# Patient Record
Sex: FEMALE | Race: WHITE | NOT HISPANIC OR LATINO | Employment: UNEMPLOYED | URBAN - NONMETROPOLITAN AREA
[De-identification: names, ages, dates, MRNs, and addresses within clinical notes are randomized per-mention and may not be internally consistent; named-entity substitution may affect disease eponyms.]

---

## 2017-07-24 ENCOUNTER — TELEPHONE (OUTPATIENT)
Dept: NEUROSURGERY | Facility: CLINIC | Age: 57
End: 2017-07-24

## 2017-07-24 NOTE — TELEPHONE ENCOUNTER
Patient called about her appt w/Tomasz on 8/2/2017.  She said she was in terrible pain, she doesn't get out of bed until after noon b/c of the pain, she says her primary care won't give her any more pain medicine.  She doesn't think she can wait until 8/2/17 to be seen.  I offered her an appt on 8/1/2017 @ 9:30 am (only 1 day sooner but that is the only opening we have at this time) but she refused stating it was too early in the morning.  She says she can't write b/c her hands are so swollen and she has severe pain in her hands.  She was very very tearful and at some points in the conversation I couldn't understand what she was saying.  I again offered her the appt on 8/1/17 but she refused again.  She said she was told to go to the ER but they won't do anything for her or give her anything either.  At this time she is going to keep the 8/2/17 appt with Tomasz.    Ismael Berg CMA

## 2017-07-27 ENCOUNTER — TELEPHONE (OUTPATIENT)
Dept: NEUROSURGERY | Facility: CLINIC | Age: 57
End: 2017-07-27

## 2017-07-27 NOTE — TELEPHONE ENCOUNTER
Patient called, she was called regarding her appointment on 8-2-17 was told to bring CD of films.  She said she was not able to go by and pick films up. She had called and spoken to radiology department and they would send them if we called them.   I made a call to phone # [phone] and asked to be transferred to radiology department.  I spoke to Angela first and told her why I was calling.  She asked Shelia to speak to me so she came to the phone.  I advised her why I  was calling.  Shelia had spoke to Jessica and told her if we would send/fax a request for the CD they could mail it. I typed up a note and asked them to send the films to our office fax #[phone] I faxed request.

## 2017-08-02 ENCOUNTER — OFFICE VISIT (OUTPATIENT)
Dept: NEUROSURGERY | Facility: CLINIC | Age: 57
End: 2017-08-02

## 2017-08-02 VITALS
BODY MASS INDEX: 31.65 KG/M2 | DIASTOLIC BLOOD PRESSURE: 90 MMHG | HEIGHT: 65 IN | WEIGHT: 190 LBS | SYSTOLIC BLOOD PRESSURE: 140 MMHG

## 2017-08-02 DIAGNOSIS — M50.20 DISPLACEMENT OF CERVICAL INTERVERTEBRAL DISC WITHOUT MYELOPATHY: Primary | ICD-10-CM

## 2017-08-02 DIAGNOSIS — F17.200 SMOKER: ICD-10-CM

## 2017-08-02 PROCEDURE — 99204 OFFICE O/P NEW MOD 45 MIN: CPT | Performed by: NURSE PRACTITIONER

## 2017-08-02 RX ORDER — OMEPRAZOLE 20 MG/1
20 CAPSULE, DELAYED RELEASE ORAL DAILY
COMMUNITY

## 2017-08-02 RX ORDER — TIZANIDINE 4 MG/1
4 TABLET ORAL NIGHTLY PRN
COMMUNITY

## 2017-08-02 RX ORDER — THEOPHYLLINE 400 MG/1
400 TABLET, EXTENDED RELEASE ORAL DAILY
COMMUNITY

## 2017-08-02 RX ORDER — LEVOTHYROXINE SODIUM 0.1 MG/1
100 TABLET ORAL DAILY
COMMUNITY

## 2017-08-02 RX ORDER — HYDROCODONE BITARTRATE AND ACETAMINOPHEN 5; 325 MG/1; MG/1
1 TABLET ORAL EVERY 8 HOURS PRN
Qty: 90 TABLET | Refills: 0 | Status: SHIPPED | OUTPATIENT
Start: 2017-08-02

## 2017-08-02 NOTE — PROGRESS NOTES
Chief complaint:   Chief Complaint   Patient presents with   • Neck Pain     Jessica is referred here today with neck pain, she also has left arm pain and numbness. She has tried physical therapy, but they discontinued her until she see her doctor.        Subjective     HPI: This is a 56-year-old female patient who is referred to us by Dr. Loera for neck and left arm pain.  She is here to be evaluated today.  She says that she just woke up back in February with complaint of neck pain and has been progressively getting worse over the last few months.  She says the pain in her neck is constant.  She says nothing will make it better.  Its worse when she sitting for too long.  She will have pain in her upper extremities bilaterally but the left is worse than the right.  She has associated numbness and tingling on the left.  The pain on the right arm only goes down to the elbow.  She says this pain is constant.  She says nothing will make it better.  She has says that any movement will make it worse.  She denies any bowel or bladder issues.  She has done about 6 sessions of therapy but says that it was making her worse and the therapy did not feel that continuing with physical therapy was given a make any improvement in the patient.  She is not any chiropractic care pain management injections.  She has been taking anti-inflammatory medication along with a muscle relaxer.  She is also had steroid injections but states only offer temporary relief.  She is right-hand dominant.  She currently is not working.  She is .    Review of Systems   HENT: Positive for hearing loss.    Respiratory: Positive for shortness of breath and wheezing.    Musculoskeletal: Positive for arthralgias, myalgias and neck pain.   Neurological: Positive for weakness, numbness and headaches.   All other systems reviewed and are negative.       Past Medical History:   Diagnosis Date   • Arthritis      Past Surgical History:   Procedure  "Laterality Date   • GALLBLADDER SURGERY       Family History   Problem Relation Age of Onset   • Heart disease Mother    • Cancer Father    • No Known Problems Sister    • No Known Problems Sister      Social History   Substance Use Topics   • Smoking status: Current Every Day Smoker   • Smokeless tobacco: None   • Alcohol use No       (Not in a hospital admission)  Allergies:  Penicillins    Objective      Vital Signs  /90 (BP Location: Right arm, Patient Position: Sitting)  Ht 65\" (165.1 cm)  Wt 190 lb (86.2 kg)  BMI 31.62 kg/m2    Physical Exam   Constitutional: She is oriented to person, place, and time. She appears well-developed and well-nourished.   HENT:   Head: Normocephalic.   Eyes: Conjunctivae, EOM and lids are normal. Pupils are equal, round, and reactive to light.   Neck: Normal range of motion.   Cardiovascular: Normal rate, regular rhythm and normal heart sounds.    Pulmonary/Chest: Effort normal and breath sounds normal.   Abdominal: Normal appearance.   Musculoskeletal: Normal range of motion.        Cervical back: She exhibits pain.   Patient is having break away weakness due to pain in her triceps and deltoid is having difficulty moving her left arm because of this.   Neurological: She is alert and oriented to person, place, and time. She has normal strength. No cranial nerve deficit or sensory deficit. GCS eye subscore is 4. GCS verbal subscore is 5. GCS motor subscore is 6.   Reflex Scores:       Bicep reflexes are 3+ on the right side and 3+ on the left side.       Brachioradialis reflexes are 3+ on the right side and 3+ on the left side.  No Hoffmans or clonus visualized.   Skin: Skin is warm.   Psychiatric: She has a normal mood and affect. Her speech is normal and behavior is normal. Thought content normal. Cognition and memory are normal.       Results Review: MRI of cervical spine shows the patient does have displacement at C5-6 that is mostly centrally located but also off to " the left putting some pressure on the nerve root on the left.  There is some mild stenosis present at C6-7 again causing more left-sided than right neural foraminal narrowing.  She also does have straightening of her cervical curvature.  No cord signal change.  No fracture visualized.          Assessment/Plan: At this point the patient has been through therapy without any relief.  She is in an extreme amount of pain from the disc herniation.  I am going to have the patient come back to see Dr. Sainz to discuss surgical intervention for her versus the possibility of injections.  I am going to go ahead and give the patient a low dose narcotic and have her sign a pain contract today.  At this point I think it would be beneficial for the patient to have someone drive her back and forth for long distances to her doctors appointment here in [place] due to the fact that the patient is in an extreme amount of pain which could affect her driving plus with the fact that the patient would be taking narcotic it is not safe for her to be driving alone.  We will have her come back and see Dr. Sainz as soon as possible.  BMI shows that she is overweight.  BMI chart was given the patient P she is a smoker.  Smoking cessation classes were given to the patient.      Jessica was seen today for neck pain.    Diagnoses and all orders for this visit:    Displacement of cervical intervertebral disc without myelopathy    Smoker    BMI 31.0-31.9,adult    Other orders  -     SCANNED - IMAGING  -     HYDROcodone-acetaminophen (NORCO) 5-325 MG per tablet; Take 1 tablet by mouth Every 8 (Eight) Hours As Needed for Moderate Pain (4-6).        I discussed the patients findings and my recommendations with patient    Tomasz Marinelli, RACHEL  08/02/17  11:10 AM

## 2017-08-03 ENCOUNTER — HOSPITAL ENCOUNTER (OUTPATIENT)
Dept: GENERAL RADIOLOGY | Facility: HOSPITAL | Age: 57
Discharge: HOME | End: 2017-08-03
Admitting: NURSE PRACTITIONER

## 2017-08-03 ENCOUNTER — OFFICE VISIT (OUTPATIENT)
Dept: NEUROSURGERY | Facility: CLINIC | Age: 57
End: 2017-08-03

## 2017-08-03 VITALS
WEIGHT: 190 LBS | DIASTOLIC BLOOD PRESSURE: 74 MMHG | BODY MASS INDEX: 31.65 KG/M2 | SYSTOLIC BLOOD PRESSURE: 112 MMHG | HEIGHT: 65 IN

## 2017-08-03 DIAGNOSIS — E66.3 OVERWEIGHT: ICD-10-CM

## 2017-08-03 DIAGNOSIS — F17.200 SMOKER: ICD-10-CM

## 2017-08-03 DIAGNOSIS — M50.20 DISPLACEMENT OF CERVICAL INTERVERTEBRAL DISC WITHOUT MYELOPATHY: Primary | ICD-10-CM

## 2017-08-03 PROCEDURE — 99213 OFFICE O/P EST LOW 20 MIN: CPT | Performed by: NURSE PRACTITIONER

## 2017-08-03 PROCEDURE — 72052 X-RAY EXAM NECK SPINE 6/>VWS: CPT

## 2017-08-03 NOTE — PROGRESS NOTES
"    Chief complaint:   Chief Complaint   Patient presents with   • Neck Pain     Patient returns to discuss what her treatment options are for her cervical pain.        Subjective     HPI: This is a 56-year-old who we saw yesterday for complaint of neck and arm pain.  She is here in follow today.  She states that she continues to have pain in her neck that is constant.  Pain will radiate down into her arms bilaterally however it is worse on the left than it is on the right.  She says the pain is constant.  She discuss pain as a sharp shooting pain.  Rates the pain a scale 0-10 at a 10.  She says it does not appear with activities of daily living.  Denies any bowel or bladder issues.  Denies any falls.  She says it does hurt worse  with activity and is better with rest.    Review of Systems   Musculoskeletal: Positive for neck pain.   Neurological: Positive for headaches.        Past Medical History:   Diagnosis Date   • Arthritis      Past Surgical History:   Procedure Laterality Date   • GALLBLADDER SURGERY       Family History   Problem Relation Age of Onset   • Heart disease Mother    • Cancer Father    • No Known Problems Sister    • No Known Problems Sister      Social History   Substance Use Topics   • Smoking status: Current Every Day Smoker   • Smokeless tobacco: Not on file   • Alcohol use No       (Not in a hospital admission)  Allergies:  Penicillins    Objective      Vital Signs  /74 (BP Location: Right arm, Patient Position: Sitting)  Ht 65\" (165.1 cm)  Wt 190 lb (86.2 kg)  BMI 31.62 kg/m2    Physical Exam   Constitutional: She is oriented to person, place, and time. She appears well-developed and well-nourished.   HENT:   Head: Normocephalic.   Eyes: EOM are normal. Pupils are equal, round, and reactive to light.   Neck: Normal range of motion.   Pulmonary/Chest: Effort normal.   Musculoskeletal: Normal range of motion.   Neurological: She is alert and oriented to person, place, and time. She " has normal strength and normal reflexes. No cranial nerve deficit or sensory deficit. Gait normal. GCS eye subscore is 4. GCS verbal subscore is 5. GCS motor subscore is 6.   Skin: Skin is warm.   Psychiatric: She has a normal mood and affect. Her speech is normal and behavior is normal. Thought content normal.       Results Review: No new imaging          Assessment/Plan: Dr. Sainz did come and discuss this with the patient she does have cervical disc displacement at C5-6 and some neural foraminal narrowing at C6-7.  The disc herniation is more prominent on the left than it is on the right.  Mild cord narrowing as well.  At this point is felt that the patient would benefit from going to pain management and having injections done see if she responds to this before considering a surgical intervention.  We will follow-up again with her in 6 weeks which time she will see Dr. Sainz.  BMI shows that she is very overweight.  BMI chart was given the patient.  She is a smoker.  Smoking cessation classes were given to the patient.      Jessica was seen today for neck pain.    Diagnoses and all orders for this visit:    Displacement of cervical intervertebral disc without myelopathy  -     XR Spine Cervical Complete With Obli Flex Ext  -     Ambulatory Referral to Family Practice    Overweight    Smoker        I discussed the patients findings and my recommendations with patient    Tomasz Marinelli, RACHEL  08/03/17  11:02 AM

## 2017-11-01 ENCOUNTER — HOSPITAL ENCOUNTER (OUTPATIENT)
Dept: PAIN MANAGEMENT | Age: 57
Discharge: HOME | End: 2017-11-01
Payer: MEDICAID

## 2017-11-01 VITALS
OXYGEN SATURATION: 99 % | RESPIRATION RATE: 18 BRPM | TEMPERATURE: 96.8 F | SYSTOLIC BLOOD PRESSURE: 147 MMHG | HEIGHT: 65 IN | BODY MASS INDEX: 32.82 KG/M2 | WEIGHT: 197 LBS | DIASTOLIC BLOOD PRESSURE: 97 MMHG | HEART RATE: 96 BPM

## 2017-11-01 PROCEDURE — 80307 DRUG TEST PRSMV CHEM ANLYZR: CPT

## 2017-11-01 PROCEDURE — 99204 OFFICE O/P NEW MOD 45 MIN: CPT

## 2017-11-01 RX ORDER — ALBUTEROL SULFATE 2.5 MG/3ML
2.5 SOLUTION RESPIRATORY (INHALATION) 4 TIMES DAILY PRN
COMMUNITY
Start: 2017-08-30

## 2017-11-01 RX ORDER — LEVOTHYROXINE SODIUM 0.1 MG/1
100 TABLET ORAL DAILY
COMMUNITY

## 2017-11-01 RX ORDER — FLUTICASONE FUROATE AND VILANTEROL TRIFENATATE 100; 25 UG/1; UG/1
1 POWDER RESPIRATORY (INHALATION) PRN
COMMUNITY
Start: 2017-08-30

## 2017-11-01 RX ORDER — THEOPHYLLINE 400 MG/1
400 TABLET, EXTENDED RELEASE ORAL DAILY
COMMUNITY

## 2017-11-01 RX ORDER — OMEPRAZOLE 20 MG/1
20 CAPSULE, DELAYED RELEASE ORAL DAILY
COMMUNITY

## 2017-11-01 RX ORDER — TIZANIDINE 4 MG/1
4 TABLET ORAL 3 TIMES DAILY PRN
COMMUNITY

## 2017-11-01 RX ORDER — HYDROCODONE BITARTRATE AND ACETAMINOPHEN 5; 325 MG/1; MG/1
1 TABLET ORAL 2 TIMES DAILY PRN
Qty: 45 TABLET | Refills: 0 | Status: SHIPPED | OUTPATIENT
Start: 2017-11-01 | End: 2017-12-15 | Stop reason: SDUPTHER

## 2017-11-01 ASSESSMENT — PAIN DESCRIPTION - LOCATION: LOCATION: NECK

## 2017-11-01 ASSESSMENT — PAIN DESCRIPTION - FREQUENCY: FREQUENCY: CONTINUOUS

## 2017-11-01 ASSESSMENT — PAIN DESCRIPTION - PROGRESSION: CLINICAL_PROGRESSION: GRADUALLY WORSENING

## 2017-11-01 ASSESSMENT — PAIN DESCRIPTION - DESCRIPTORS: DESCRIPTORS: ACHING;SHARP;STABBING

## 2017-11-01 ASSESSMENT — ACTIVITIES OF DAILY LIVING (ADL): EFFECT OF PAIN ON DAILY ACTIVITIES: LIMITS ACTIVITIES

## 2017-11-01 ASSESSMENT — PAIN SCALES - GENERAL: PAINLEVEL_OUTOF10: 9

## 2017-11-01 ASSESSMENT — PAIN DESCRIPTION - PAIN TYPE: TYPE: CHRONIC PAIN

## 2017-11-01 ASSESSMENT — PAIN DESCRIPTION - ORIENTATION: ORIENTATION: LOWER

## 2017-11-01 ASSESSMENT — PAIN DESCRIPTION - ONSET: ONSET: ON-GOING

## 2017-11-01 NOTE — H&P
negative    Physical exam:  General appearance: no acute distress  Head: NCAT, EOMI, PERRL  Lungs: normal respiratory effort, faint wheezes bilaterally, non-productive cough noted intermittently  CV: RRR, no pedal edema  Abdomen: soft, nondistended, bowel sounds present  Extremities: no cyanosis, palpable pulses  SKIN: warm and dry  Musculoskeletal: ambulatory per self, gait steady  Neck Exam: report of pain with active flexion, extension and lateral rotation  Shoulder Exam: ROM grossly intact  Back Exam: ROM grossly intact  Hip Exam: ROM grossly intact  Knee exam: report of pain with active ROM to right, grossly intact ROM to left  Neurologic: alert and oriented X 3, speech clear, hoarse tone  CN EXAM: grossly intact, facial symmetry  Sensation: grossly intact to touch  Mood and affect: appropriate, no SI or HI    Assessment:    *     Cervical DDD  *     Cervical Radiculopathy  *     Joint Pain, Right Knee    Plan:   [x]  Patient is to call with any questions or concerns which may arise prior to the next office visit    [x]  Continue current Norco 5/325 mg BID, PRN, #45, (BAKARI reviewed)   []  Add    []  Imaging order given to patient   []  Imaging reports requested   []  PT order given to patient   [x]  Procedure scheduled for next visit, see encounter details (Cervical Epidural injection, C6-7, with sedation)   [x]  UDS done today   []  UDS next visit    []  . .. Controlled Substance Monitoring: Discussed possible narcotic pain medication side effects, risk of tolerance and/or dependence, and alternative treatments. Discussed the effects of long term narcotic use. Patient encouraged to set daily goals of exercising and decreasing daily narcotic intake.       Electronically signed by SHANNA Rodarte

## 2017-11-02 LAB
AMPHETAMINES, URINE: NEGATIVE NG/ML
BARBITURATES, URINE: NEGATIVE NG/ML
BENZODIAZEPINES, URINE: NEGATIVE NG/ML
CANNABINOIDS, URINE: NEGATIVE NG/ML
COCAINE METABOLITE, URINE: NEGATIVE NG/ML
CREATININE, URINE: 95.3 MG/DL (ref 20–300)
ETHANOL U, QUAN: NEGATIVE %
FENTANYL URINE: NEGATIVE PG/ML
MEPERIDINE, UR: NEGATIVE NG/ML
METHADONE SCREEN, URINE: NEGATIVE NG/ML
OPIATES, URINE: NEGATIVE NG/ML
OXYCODONE/OXYMORPHONE, UR: NEGATIVE NG/ML
PH, URINE: 5.9 (ref 4.5–8.9)
PHENCYCLIDINE, URINE: NEGATIVE NG/ML
PROPOXYPHENE, URINE: NEGATIVE NG/ML

## 2017-12-15 ENCOUNTER — HOSPITAL ENCOUNTER (OUTPATIENT)
Dept: PAIN MANAGEMENT | Age: 57
Discharge: HOME | End: 2017-12-15
Payer: MEDICAID

## 2017-12-15 VITALS
BODY MASS INDEX: 32.49 KG/M2 | HEIGHT: 65 IN | DIASTOLIC BLOOD PRESSURE: 80 MMHG | OXYGEN SATURATION: 99 % | WEIGHT: 195 LBS | TEMPERATURE: 97.3 F | HEART RATE: 100 BPM | RESPIRATION RATE: 18 BRPM | SYSTOLIC BLOOD PRESSURE: 127 MMHG

## 2017-12-15 DIAGNOSIS — M54.2 NECK PAIN: ICD-10-CM

## 2017-12-15 DIAGNOSIS — M50.10 CERVICAL DISC DISORDER WITH RADICULOPATHY: Chronic | ICD-10-CM

## 2017-12-15 PROCEDURE — 3209999900 FLUORO FOR SURGICAL PROCEDURES

## 2017-12-15 PROCEDURE — 99152 MOD SED SAME PHYS/QHP 5/>YRS: CPT

## 2017-12-15 PROCEDURE — 2580000003 HC RX 258

## 2017-12-15 PROCEDURE — 2500000003 HC RX 250 WO HCPCS

## 2017-12-15 PROCEDURE — 62323 NJX INTERLAMINAR LMBR/SAC: CPT

## 2017-12-15 PROCEDURE — 6360000002 HC RX W HCPCS

## 2017-12-15 RX ORDER — BUPIVACAINE HYDROCHLORIDE 2.5 MG/ML
INJECTION, SOLUTION EPIDURAL; INFILTRATION; INTRACAUDAL
Status: COMPLETED | OUTPATIENT
Start: 2017-12-15 | End: 2017-12-15

## 2017-12-15 RX ORDER — METHYLPREDNISOLONE ACETATE 80 MG/ML
INJECTION, SUSPENSION INTRA-ARTICULAR; INTRALESIONAL; INTRAMUSCULAR; SOFT TISSUE
Status: COMPLETED | OUTPATIENT
Start: 2017-12-15 | End: 2017-12-15

## 2017-12-15 RX ORDER — NAPROXEN 500 MG/1
500 TABLET ORAL 2 TIMES DAILY WITH MEALS
COMMUNITY
End: 2017-12-15 | Stop reason: SDUPTHER

## 2017-12-15 RX ORDER — LIDOCAINE HYDROCHLORIDE 10 MG/ML
INJECTION, SOLUTION EPIDURAL; INFILTRATION; INTRACAUDAL; PERINEURAL
Status: COMPLETED | OUTPATIENT
Start: 2017-12-15 | End: 2017-12-15

## 2017-12-15 RX ORDER — 0.9 % SODIUM CHLORIDE 0.9 %
VIAL (ML) INJECTION
Status: COMPLETED | OUTPATIENT
Start: 2017-12-15 | End: 2017-12-15

## 2017-12-15 RX ORDER — MIDAZOLAM HYDROCHLORIDE 1 MG/ML
INJECTION INTRAMUSCULAR; INTRAVENOUS
Status: COMPLETED | OUTPATIENT
Start: 2017-12-15 | End: 2017-12-15

## 2017-12-15 RX ADMIN — MIDAZOLAM HYDROCHLORIDE 2 MG: 1 INJECTION INTRAMUSCULAR; INTRAVENOUS at 14:14

## 2017-12-15 RX ADMIN — BUPIVACAINE HYDROCHLORIDE 2.5 ML: 2.5 INJECTION, SOLUTION EPIDURAL; INFILTRATION; INTRACAUDAL at 14:21

## 2017-12-15 RX ADMIN — Medication 1.5 ML: at 14:22

## 2017-12-15 RX ADMIN — LIDOCAINE HYDROCHLORIDE 5 ML: 10 INJECTION, SOLUTION EPIDURAL; INFILTRATION; INTRACAUDAL; PERINEURAL at 14:18

## 2017-12-15 RX ADMIN — METHYLPREDNISOLONE ACETATE 80 MG: 80 INJECTION, SUSPENSION INTRA-ARTICULAR; INTRALESIONAL; INTRAMUSCULAR; SOFT TISSUE at 14:22

## 2017-12-15 ASSESSMENT — PAIN DESCRIPTION - DESCRIPTORS: DESCRIPTORS: ACHING;SHARP;STABBING

## 2017-12-15 ASSESSMENT — PAIN - FUNCTIONAL ASSESSMENT
PAIN_FUNCTIONAL_ASSESSMENT: 0-10

## 2017-12-15 ASSESSMENT — ACTIVITIES OF DAILY LIVING (ADL): EFFECT OF PAIN ON DAILY ACTIVITIES: LIMITS ADLS

## 2017-12-15 NOTE — PROGRESS NOTES
Yes    Education Provided:  [x] Review of [facility]  [x] Agreement Review  [] Compliance Issues Discussed    [] Cognitive Behavior Needs [x] Exercise [] Review of Test [] Financial Issues  [] Tobacco/Alcohol Use [x] Teaching [] New Patient [] Picture Obtained    Physician Plan:  [] Outgoing Referral  [] Pharmacy Consult  [] Test Ordered   [] Obtained Test Results / Consult Notes  [] UDS due at next visit, verified per EPIC      [] Suspected Physical Abuse or Suicide Risk assessed - IF YES COMPLETE QUESTIONS BELOW    If any of the following questions are answered yes - contact attending physician for referral:    Has been considering harming self to escape stress, pain problems? [] YES  [] NO  Has a suicide plan? [] YES  [] NO  Has attempted suicide in the past?   [] YES  [] NO  Has a close friend or family member who committed suicide? [] YES  [] NO    Patient Referred To :      Additional Notes:    Assessment Completed by:  Electronically signed by Love Stone RN on 12/15/2017 at 11:44 AM

## 2017-12-15 NOTE — PROCEDURES
Patient Name: Hardeep Benavidez  : 1960  MRN: [id number]    PRE-SEDATION ASSESSMENT    Procedure:  [unfilled]  I have examined the patient's status immediately prior to the procedure. BRIEF H&P    HPI/Changes/Indicators/Diagnosis  There are no active hospital problems to display for this patient. Medications:  Prior to Admission medications    Medication Sig Start Date End Date Taking? Authorizing Provider   lidocaine (LIDODERM) 5 % Place 1 patch onto the skin daily 12 hours on, 12 hours off. 12/15/17   Toshia Carrillo MD   HYDROcodone-acetaminophen (NORCO) 5-325 MG per tablet Take 1 tablet by mouth 2 times daily as needed for Pain (month supply) . 12/15/17   Toshia Carrillo MD   naproxen (EC NAPROSYN) 500 MG EC tablet Take 1 tablet by mouth 2 times daily (with meals) 12/15/17   Toshia Carrillo MD   fluticasone (VERAMYST) 27.5 MCG/SPRAY nasal spray 2 sprays by Nasal route 2 times daily    Historical Provider, MD   levothyroxine (SYNTHROID) 100 MCG tablet Take 100 mcg by mouth daily    Historical Provider, MD   omeprazole (PRILOSEC) 20 MG delayed release capsule Take 20 mg by mouth daily    Historical Provider, MD   theophylline (UNIPHYL) 400 MG extended release tablet Take 400 mg by mouth daily    Historical Provider, MD   tiZANidine (ZANAFLEX) 4 MG tablet Take 4 mg by mouth 3 times daily as needed    Historical Provider, MD   albuterol (PROVENTIL) (2.5 MG/3ML) 0.083% nebulizer solution Take 2.5 mg by nebulization 4 times daily as needed 17   Historical Provider, MD BROWN  (90 Base) MCG/ACT inhaler Inhale 1 puff into the lungs as needed 17   Historical Provider, MD LOPEZ ELLIPTA 100-25 MCG/INH AEPB inhaler Inhale 1 puff into the lungs as needed 17   Historical Provider, MD       Allergies:  is allergic to bactrim [sulfamethoxazole-trimethoprim]; penicillins; and symbicort [budesonide-formoterol fumarate].     Vital Signs:  Vitals:    12/15/17 1207   BP: (!) 155/98   Pulse: 96 Resp: 20   Temp: 97.3 °F (36.3 °C)   SpO2: 98%       Physical Exam:  Cardiac:                                    [x]WNL                    []Comments:    Pulmonary:                               [x]WNL                    []Comments:    Neuro/Mental Status:               [x]WNL                    []Comments:      Informed Consent:  The risks and benefits of the procedure have been discussed with either the patient or if they cannot consent their representative. Assessment:  Patient examined and appropriate for the planned sedation and procedure. Plan:  Proceed with planned sedation and procedure as above. Mallampati Airway Assessment: Adequate      ASA STATUS:  []1. Normal Healty  []2. Mild Systemice Disease, doesn't limit activity eg HTN, mild DM  [x]3. Severe Systemic Disease, does limit activity eg stable angina, DM with vascular         disease          []4. Severe Systemic Disease constant threat eg CHF, renal failure  []5.  Moribund not expected to survive without procedure          Edgar Arndt RN

## 2017-12-15 NOTE — PROCEDURES
Term  Motivational Level:  [x]Asks Questions                  []Extremely Anxious       [x]Seems Interested               []Seems Uninterested                  [x]Denies need for Education  Risk for Injury:  [x]Patient oriented to person, place and time  []History of frequent falls/loss of balance  Nutritional:  []Change in appetite   []Weight Gain   []Weight Loss  Functional:    Nursing Admission Record   Current Issues / Falls / ER Visits:  No   Percentage of Pain Relief after Last Procedure: na  Radiology exams received during the last 12 months: Yes       When [facility] on chart: Yes         Imaging records requested: Yes  MRI exams received in the past 2 years:  Yes  Physical therapy during the last 6 months: Yes       [facility]                                               Labs during the last 12 months: Yes      COMMENTS:  Patient complains of neck pain. Pt has had pain since March or April. She has stiffness in her neck as well. She is unable to move her arms to full range of motion. Pt was in physical therapy approximately 4 months ago. She does not feel she gained any progress with therapy. She feels her arm weakness and decrease in range of motion comes from her neck. She only moves her arms at a level even with her shoulder. She has pain upon flexion of her neck. She is also tender upon palpation. She has pain with any rotation of her neck. She is a smoker. She is not attempting to quit at this time. Discussed the negative effects of smoking on a patient's pain score. Discussed the risk and benefits of procedure with patient. Will proceed today with Cervical Epidural Steroid Injections.         PLAN:  [x]  Will return to office in 1 month(s) for [] Planned Procedure [] Office Visit  [x]  Prescriptions given today          [] No prescriptions needed today  [] Office Visit  [x]  Patient is to call with any questions or concerns which may arise prior to the next office visit. [x] Cervical Epidural C6-7                    [x]Over 50% of today's appointment was given to discussion, evaluation and counseling.

## 2017-12-19 ENCOUNTER — TELEPHONE (OUTPATIENT)
Dept: PAIN MANAGEMENT | Age: 57
End: 2017-12-19

## 2017-12-19 NOTE — TELEPHONE ENCOUNTER
Patient called to state that she had injections on 12/15/17 and was wondering if it is typical to have a fever. Patient reports temp of 100 degrees. Explained that fever is not a typical side effect, and to take tylenol. Patient also stated that she was prescribed Lidocaine patches but her pharmacy is requiring an authorization.   Instructed her to leave a message on the prescription line, and call transferred to [phone]

## 2018-02-01 ENCOUNTER — HOSPITAL ENCOUNTER (OUTPATIENT)
Dept: PAIN MANAGEMENT | Age: 58
Discharge: HOME | End: 2018-02-01
Payer: MEDICAID

## 2018-02-01 VITALS
DIASTOLIC BLOOD PRESSURE: 94 MMHG | SYSTOLIC BLOOD PRESSURE: 152 MMHG | TEMPERATURE: 96.9 F | RESPIRATION RATE: 18 BRPM | HEART RATE: 113 BPM | BODY MASS INDEX: 33.99 KG/M2 | WEIGHT: 204 LBS | HEIGHT: 65 IN | OXYGEN SATURATION: 97 %

## 2018-02-01 DIAGNOSIS — G89.29 CHRONIC RIGHT-SIDED LOW BACK PAIN WITH RIGHT-SIDED SCIATICA: ICD-10-CM

## 2018-02-01 DIAGNOSIS — M54.41 CHRONIC RIGHT-SIDED LOW BACK PAIN WITH RIGHT-SIDED SCIATICA: ICD-10-CM

## 2018-02-01 DIAGNOSIS — M25.571 CHRONIC PAIN OF RIGHT ANKLE: ICD-10-CM

## 2018-02-01 DIAGNOSIS — G89.29 CHRONIC PAIN OF RIGHT KNEE: ICD-10-CM

## 2018-02-01 DIAGNOSIS — M50.10 CERVICAL DISC DISORDER WITH RADICULOPATHY: ICD-10-CM

## 2018-02-01 DIAGNOSIS — M25.561 CHRONIC PAIN OF RIGHT KNEE: ICD-10-CM

## 2018-02-01 DIAGNOSIS — F41.9 ANXIETY: Primary | ICD-10-CM

## 2018-02-01 DIAGNOSIS — G89.29 CHRONIC PAIN OF RIGHT ANKLE: ICD-10-CM

## 2018-02-01 PROCEDURE — 99214 OFFICE O/P EST MOD 30 MIN: CPT

## 2018-02-01 RX ORDER — HYDROCODONE BITARTRATE AND ACETAMINOPHEN 5; 325 MG/1; MG/1
1 TABLET ORAL 2 TIMES DAILY PRN
Qty: 45 TABLET | Refills: 0 | Status: SHIPPED | OUTPATIENT
Start: 2018-02-01 | End: 2018-03-09 | Stop reason: SDUPTHER

## 2018-02-01 RX ORDER — LIDOCAINE AND PRILOCAINE 25; 25 MG/G; MG/G
CREAM TOPICAL
Qty: 3 TUBE | Refills: 2 | Status: SHIPPED | OUTPATIENT
Start: 2018-02-01

## 2018-02-01 ASSESSMENT — PAIN DESCRIPTION - ONSET: ONSET: ON-GOING

## 2018-02-01 ASSESSMENT — PAIN DESCRIPTION - DESCRIPTORS: DESCRIPTORS: ACHING;PRESSURE;CONSTANT

## 2018-02-01 ASSESSMENT — PAIN DESCRIPTION - LOCATION: LOCATION: NECK;KNEE;ANKLE

## 2018-02-01 ASSESSMENT — ACTIVITIES OF DAILY LIVING (ADL): EFFECT OF PAIN ON DAILY ACTIVITIES: LIMITS ACTIVITIES

## 2018-02-01 ASSESSMENT — PAIN SCALES - GENERAL: PAINLEVEL_OUTOF10: 8

## 2018-02-01 ASSESSMENT — PAIN DESCRIPTION - ORIENTATION: ORIENTATION: LOWER

## 2018-02-01 ASSESSMENT — PAIN DESCRIPTION - PROGRESSION: CLINICAL_PROGRESSION: NOT CHANGED

## 2018-02-01 ASSESSMENT — PAIN DESCRIPTION - PAIN TYPE: TYPE: CHRONIC PAIN

## 2018-02-01 ASSESSMENT — PAIN DESCRIPTION - FREQUENCY: FREQUENCY: CONTINUOUS

## 2018-02-01 NOTE — PROGRESS NOTES
Vickie Jarvis/Santi  Patient Pain Assessment  Progress Note       Chief Complaint   Patient presents with    Neck Pain     radiates down bilateral arms    Knee Pain     right    Ankle Pain     right     Pain Assessment  Pain Assessment: 0-10  Pain Level: 8  Pain Type: Chronic pain  Pain Location: Neck, Knee, Ankle  Pain Orientation: Lower  Pain Radiating Towards: radiates into shoulders and arms; right knee; right ankle  Pain Descriptors: Aching, Pressure, Constant  Pain Frequency: Continuous  Pain Onset: On-going  Clinical Progression: Not changed  Effect of Pain on Daily Activities: limits activities  Patient's Stated Pain Goal: No pain  Pain Intervention(s): Medication (see eMar), Repositioned, Rest  Response to Pain Intervention: Patient Satisfied  Multiple Pain Sites: No         [x]  Issues of Concern:    Reports she was supposed to have a psychiatric referral after seeing Dr. Nicolina Peabody last visit, but says she never received at call. Will re-send referral today.  Also reporting she has pain in lumbar region, right knee and right ankle - see plan below     [x]  Reports current medication is helping, but continues to have on-going pain    [x]  Reports current pain medication increases ability to do activities of daily living     []  No current narcotic pain medications      [x]  Discussed possible medication side effects, risk of tolerance and/or dependence, alternative treatments    [x]  Encouraged to set goals of decreasing daily narcotic intake    [x]  Discussed effects of long term narcotic use    [x]  Reviewed pain management contract     [x]  Injection options discussed, will add right knee and right ankle along with repeat ELÍAS     []Yes [x]No  Current medication side effects     []Yes [x]No   Acute bladder or bowel changes    Previous Procedure / Percentage of pain control / Imaging / PT History:  Percentage of Pain Relief after Last Procedure:  60 %  How long lasted: 1 month     Radiology exams Take 1 tablet by mouth 2 times daily as needed for Pain (month supply) . 45 tablet 0    naproxen (EC NAPROSYN) 500 MG EC tablet Take 1 tablet by mouth 2 times daily (with meals) 60 tablet 5    fluticasone (VERAMYST) 27.5 MCG/SPRAY nasal spray 2 sprays by Nasal route 2 times daily      levothyroxine (SYNTHROID) 100 MCG tablet Take 100 mcg by mouth daily      omeprazole (PRILOSEC) 20 MG delayed release capsule Take 20 mg by mouth daily      theophylline (UNIPHYL) 400 MG extended release tablet Take 400 mg by mouth daily      tiZANidine (ZANAFLEX) 4 MG tablet Take 4 mg by mouth 3 times daily as needed      albuterol (PROVENTIL) (2.5 MG/3ML) 0.083% nebulizer solution Take 2.5 mg by nebulization 4 times daily as needed      VENTOLIN  (90 Base) MCG/ACT inhaler Inhale 1 puff into the lungs as needed      BREO ELLIPTA 100-25 MCG/INH AEPB inhaler Inhale 1 puff into the lungs as needed       No current facility-administered medications for this encounter. UDS:     [x] Reviewed and monitoring, see labs         Vitals:  BP (!) 152/94   Pulse 113   Temp 96.9 °F (36.1 °C) (Oral)   Resp 18   Ht 5' 5\" (1.651 m)   Wt 204 lb (92.5 kg)   SpO2 97%   BMI 33.95 kg/m²      [x] Discussed using home B/P monitor/diary and to contact PCP if elevated, as applicable     Physical Exam:  General appearance: no acute distress   Head: NCAT, EOMI  Skin: Warm, Dry   Musculoskeletal: ambulatory per self, steady gait  Neurologic: alert and oriented X 3, speech clear  Mood and affect: appropriate, no SI or HI    Assessment:    *      Cervical DDD  *      Cervical Radiculopathy  *      Joint Pain, Right knee  *      Joint Pain, Right ankle    Plan:   [x]  Patient is to call with any questions or concerns which may arise prior to the next office visit    [x]  Continue current medications per our office, see medication tabBAKARI reviewed   []  Add. .. []  Discontinue. ..    [x]  Imaging order given to patient, Right knee XR,

## 2018-02-01 NOTE — PROGRESS NOTES
Nursing Admission Record    Current Issues / Falls / ER Visits:  No    Percentage of Pain Relief after Last Procedure:  60 %    How long lasted:  1 month    Radiology exams received during the last 12 months: Yes Cervical xray       When august 2017                                              Where [address] on chart: Yes         Imaging records requested: No  MRI exams received in the past 2 years:  No  Physical therapy during the last 6 months: Yes       When: 2017                                             Where [facility] during the last 12 months: Yes    Education Provided:  [x] Review of Ronda Baez  [] Agreement Review  [] Compliance Issues Discussed    [] Cognitive Behavior Needs [x] Exercise [] Review of Test [] Financial Issues  [x] Tobacco/Alcohol Use [x] Teaching [] New Patient [] Picture Obtained    Physician Plan:  [] Outgoing Referral  [] Pharmacy Consult  [] Test Ordered   [] Obtained Test Results / Consult Notes  [x] UDS due at next visit, verified per EPIC      [] Suspected Physical Abuse or Suicide Risk assessed - IF YES COMPLETE QUESTIONS BELOW    If any of the following questions are answered yes - contact attending physician for referral:    Has been considering harming self to escape stress, pain problems? [] YES  [] NO  Has a suicide plan? [] YES  [] NO  Has attempted suicide in the past?   [] YES  [] NO  Has a close friend or family member who committed suicide? [] YES  [] NO    Patient Referred To :      Additional Notes:    Assessment Completed by:  Electronically signed by Alyssia Villatoro RN on 2/1/2018 at 1:51 PM

## 2018-03-09 RX ORDER — HYDROCODONE BITARTRATE AND ACETAMINOPHEN 5; 325 MG/1; MG/1
1 TABLET ORAL 2 TIMES DAILY PRN
Qty: 45 TABLET | Refills: 0 | Status: SHIPPED | OUTPATIENT
Start: 2018-03-10 | End: 2018-03-19 | Stop reason: SDUPTHER

## 2018-03-19 ENCOUNTER — HOSPITAL ENCOUNTER (OUTPATIENT)
Dept: PAIN MANAGEMENT | Age: 58
Discharge: HOME | End: 2018-03-19
Payer: MEDICAID

## 2018-03-19 VITALS
RESPIRATION RATE: 20 BRPM | OXYGEN SATURATION: 95 % | SYSTOLIC BLOOD PRESSURE: 122 MMHG | HEART RATE: 82 BPM | DIASTOLIC BLOOD PRESSURE: 76 MMHG

## 2018-03-19 DIAGNOSIS — M54.2 NECK PAIN: ICD-10-CM

## 2018-03-19 DIAGNOSIS — M50.10 CERVICAL DISC DISORDER WITH RADICULOPATHY: ICD-10-CM

## 2018-03-19 PROCEDURE — 20610 DRAIN/INJ JOINT/BURSA W/O US: CPT

## 2018-03-19 PROCEDURE — 2500000003 HC RX 250 WO HCPCS

## 2018-03-19 PROCEDURE — 20605 DRAIN/INJ JOINT/BURSA W/O US: CPT

## 2018-03-19 PROCEDURE — 99152 MOD SED SAME PHYS/QHP 5/>YRS: CPT

## 2018-03-19 PROCEDURE — 3209999900 FLUORO FOR SURGICAL PROCEDURES

## 2018-03-19 PROCEDURE — 6360000002 HC RX W HCPCS

## 2018-03-19 PROCEDURE — 2580000003 HC RX 258

## 2018-03-19 PROCEDURE — 62321 NJX INTERLAMINAR CRV/THRC: CPT

## 2018-03-19 PROCEDURE — 80307 DRUG TEST PRSMV CHEM ANLYZR: CPT

## 2018-03-19 RX ORDER — BUPIVACAINE HYDROCHLORIDE 5 MG/ML
INJECTION, SOLUTION EPIDURAL; INTRACAUDAL
Status: COMPLETED | OUTPATIENT
Start: 2018-03-19 | End: 2018-03-19

## 2018-03-19 RX ORDER — MIDAZOLAM HYDROCHLORIDE 1 MG/ML
INJECTION INTRAMUSCULAR; INTRAVENOUS
Status: COMPLETED | OUTPATIENT
Start: 2018-03-19 | End: 2018-03-19

## 2018-03-19 RX ORDER — TRIAMCINOLONE ACETONIDE 40 MG/ML
INJECTION, SUSPENSION INTRA-ARTICULAR; INTRAMUSCULAR
Status: COMPLETED | OUTPATIENT
Start: 2018-03-19 | End: 2018-03-19

## 2018-03-19 RX ORDER — METHYLPREDNISOLONE ACETATE 80 MG/ML
INJECTION, SUSPENSION INTRA-ARTICULAR; INTRALESIONAL; INTRAMUSCULAR; SOFT TISSUE
Status: COMPLETED | OUTPATIENT
Start: 2018-03-19 | End: 2018-03-19

## 2018-03-19 RX ORDER — BUPIVACAINE HYDROCHLORIDE 2.5 MG/ML
INJECTION, SOLUTION EPIDURAL; INFILTRATION; INTRACAUDAL
Status: COMPLETED | OUTPATIENT
Start: 2018-03-19 | End: 2018-03-19

## 2018-03-19 RX ORDER — 0.9 % SODIUM CHLORIDE 0.9 %
VIAL (ML) INJECTION
Status: COMPLETED | OUTPATIENT
Start: 2018-03-19 | End: 2018-03-19

## 2018-03-19 RX ORDER — LIDOCAINE HYDROCHLORIDE 10 MG/ML
INJECTION, SOLUTION EPIDURAL; INFILTRATION; INTRACAUDAL; PERINEURAL
Status: COMPLETED | OUTPATIENT
Start: 2018-03-19 | End: 2018-03-19

## 2018-03-19 RX ADMIN — BUPIVACAINE HYDROCHLORIDE 2.5 ML: 2.5 INJECTION, SOLUTION EPIDURAL; INFILTRATION; INTRACAUDAL at 12:51

## 2018-03-19 RX ADMIN — TRIAMCINOLONE ACETONIDE 40 MG: 40 INJECTION, SUSPENSION INTRA-ARTICULAR; INTRAMUSCULAR at 12:55

## 2018-03-19 RX ADMIN — BUPIVACAINE HYDROCHLORIDE 4.5 ML: 5 INJECTION, SOLUTION EPIDURAL; INTRACAUDAL at 12:53

## 2018-03-19 RX ADMIN — METHYLPREDNISOLONE ACETATE 80 MG: 80 INJECTION, SUSPENSION INTRA-ARTICULAR; INTRALESIONAL; INTRAMUSCULAR; SOFT TISSUE at 12:53

## 2018-03-19 RX ADMIN — TRIAMCINOLONE ACETONIDE 20 MG: 40 INJECTION, SUSPENSION INTRA-ARTICULAR; INTRAMUSCULAR at 12:54

## 2018-03-19 RX ADMIN — MIDAZOLAM HYDROCHLORIDE 2 MG: 1 INJECTION INTRAMUSCULAR; INTRAVENOUS at 12:47

## 2018-03-19 RX ADMIN — BUPIVACAINE HYDROCHLORIDE 9 ML: 5 INJECTION, SOLUTION EPIDURAL; INTRACAUDAL at 12:55

## 2018-03-19 RX ADMIN — LIDOCAINE HYDROCHLORIDE 3 ML: 10 INJECTION, SOLUTION EPIDURAL; INFILTRATION; INTRACAUDAL; PERINEURAL at 12:50

## 2018-03-19 RX ADMIN — Medication 1.5 ML: at 12:52

## 2018-03-19 NOTE — PROCEDURES
at [facility] recently. Patient also had recent MRI of cervical spine. Patient reports pain in neck, swelling and pain in right ankle and knee. Patient reports 60 % of relief from last cervical epidural. Her upper extremity symptoms are starting to return. Patient has not had her nerve conduction study performed yet. Explained risk and benefit of injections today with patient and patient aggreable to proceed with ELÍAS, right ankle and right knee injections today. Her mother is dying, she relates and this is a source of stress. [] Over 50% of today's appointment was given to discussion, evaluation and counseling.

## 2018-03-19 NOTE — PROGRESS NOTES
this visit. Physical Exam:  Cardiac:                                    [x]WNL                    []Comments:    Pulmonary:                               [x]WNL                    []Comments:    Neuro/Mental Status:               [x]WNL                    []Comments:      Informed Consent:  The risks and benefits of the procedure have been discussed with either the patient or if they cannot consent their representative. Assessment:  Patient examined and appropriate for the planned sedation and procedure. Plan:  Proceed with planned sedation and procedure as above. Mallampati Airway Assessment: Adequate      ASA STATUS:  []1. Normal Healty  []2. Mild Systemice Disease, doesn't limit activity eg HTN, mild DM  [x]3. Severe Systemic Disease, does limit activity eg stable angina, DM with vascular         disease          []4. Severe Systemic Disease constant threat eg CHF, renal failure  []5.  Moribund not expected to survive without procedure          Paolo Still RN

## 2018-03-20 LAB
AMPHETAMINES, URINE: NEGATIVE NG/ML
BARBITURATES, URINE: NEGATIVE NG/ML
BENZODIAZEPINES, URINE: NEGATIVE NG/ML
CANNABINOIDS, URINE: NEGATIVE NG/ML
COCAINE METABOLITE, URINE: NEGATIVE NG/ML
CREATININE, URINE: 148.2 MG/DL (ref 20–300)
ETHANOL U, QUAN: NEGATIVE %
FENTANYL URINE: NEGATIVE PG/ML
MEPERIDINE, UR: NEGATIVE NG/ML
METHADONE SCREEN, URINE: NEGATIVE NG/ML
OPIATES, URINE: NEGATIVE NG/ML
OXYCODONE/OXYMORPHONE, UR: NEGATIVE NG/ML
PH, URINE: 7.9 (ref 4.5–8.9)
PHENCYCLIDINE, URINE: NEGATIVE NG/ML
PROPOXYPHENE, URINE: NEGATIVE NG/ML

## 2018-04-19 RX ORDER — HYDROCODONE BITARTRATE AND ACETAMINOPHEN 5; 325 MG/1; MG/1
1 TABLET ORAL 2 TIMES DAILY PRN
Qty: 45 TABLET | Refills: 0 | Status: SHIPPED | OUTPATIENT
Start: 2018-04-20 | End: 2018-05-11 | Stop reason: SDUPTHER

## 2018-05-11 ENCOUNTER — HOSPITAL ENCOUNTER (OUTPATIENT)
Dept: PAIN MANAGEMENT | Age: 58
Discharge: HOME | End: 2018-05-11
Payer: MEDICAID

## 2018-05-11 VITALS
SYSTOLIC BLOOD PRESSURE: 143 MMHG | BODY MASS INDEX: 30.32 KG/M2 | DIASTOLIC BLOOD PRESSURE: 97 MMHG | TEMPERATURE: 98.3 F | OXYGEN SATURATION: 98 % | WEIGHT: 182 LBS | RESPIRATION RATE: 18 BRPM | HEART RATE: 94 BPM | HEIGHT: 65 IN

## 2018-05-11 DIAGNOSIS — M50.10 CERVICAL DISC DISORDER WITH RADICULOPATHY: ICD-10-CM

## 2018-05-11 PROCEDURE — 99212 OFFICE O/P EST SF 10 MIN: CPT

## 2018-05-11 RX ORDER — HYDROCODONE BITARTRATE AND ACETAMINOPHEN 5; 325 MG/1; MG/1
1 TABLET ORAL 2 TIMES DAILY PRN
Qty: 60 TABLET | Refills: 0 | Status: SHIPPED | OUTPATIENT
Start: 2018-05-11 | End: 2018-06-11 | Stop reason: SDUPTHER

## 2018-05-11 ASSESSMENT — PAIN DESCRIPTION - FREQUENCY: FREQUENCY: CONTINUOUS

## 2018-05-11 ASSESSMENT — PAIN DESCRIPTION - DESCRIPTORS: DESCRIPTORS: ACHING;CONSTANT;PRESSURE

## 2018-05-11 ASSESSMENT — ACTIVITIES OF DAILY LIVING (ADL): EFFECT OF PAIN ON DAILY ACTIVITIES: LIMITS ACTIVITIES

## 2018-05-11 ASSESSMENT — PAIN SCALES - GENERAL: PAINLEVEL_OUTOF10: 9

## 2018-05-11 ASSESSMENT — PAIN DESCRIPTION - PAIN TYPE: TYPE: CHRONIC PAIN

## 2018-05-11 ASSESSMENT — PAIN DESCRIPTION - ONSET: ONSET: ON-GOING

## 2018-05-11 ASSESSMENT — PAIN DESCRIPTION - ORIENTATION: ORIENTATION: LOWER;RIGHT;UPPER

## 2018-05-11 ASSESSMENT — PAIN DESCRIPTION - PROGRESSION: CLINICAL_PROGRESSION: NOT CHANGED

## 2018-06-11 DIAGNOSIS — M50.10 CERVICAL DISC DISORDER WITH RADICULOPATHY: Primary | ICD-10-CM

## 2018-06-12 RX ORDER — HYDROCODONE BITARTRATE AND ACETAMINOPHEN 5; 325 MG/1; MG/1
1 TABLET ORAL 2 TIMES DAILY PRN
Qty: 60 TABLET | Refills: 0 | Status: SHIPPED | OUTPATIENT
Start: 2018-06-13 | End: 2018-07-09 | Stop reason: SDUPTHER

## 2018-07-09 DIAGNOSIS — M50.10 CERVICAL DISC DISORDER WITH RADICULOPATHY: ICD-10-CM

## 2018-07-10 RX ORDER — HYDROCODONE BITARTRATE AND ACETAMINOPHEN 5; 325 MG/1; MG/1
1 TABLET ORAL 2 TIMES DAILY PRN
Qty: 60 TABLET | Refills: 0 | Status: SHIPPED | OUTPATIENT
Start: 2018-07-13 | End: 2018-08-12

## 2018-07-12 ENCOUNTER — HOSPITAL ENCOUNTER (OUTPATIENT)
Dept: PAIN MANAGEMENT | Age: 58
Discharge: HOME | End: 2018-07-12
Payer: MEDICAID

## 2018-07-12 VITALS
OXYGEN SATURATION: 98 % | BODY MASS INDEX: 31.32 KG/M2 | TEMPERATURE: 97.6 F | HEART RATE: 82 BPM | HEIGHT: 65 IN | WEIGHT: 188 LBS | SYSTOLIC BLOOD PRESSURE: 140 MMHG | RESPIRATION RATE: 20 BRPM | DIASTOLIC BLOOD PRESSURE: 82 MMHG

## 2018-07-12 DIAGNOSIS — M50.10 CERVICAL DISC DISORDER WITH RADICULOPATHY: Chronic | ICD-10-CM

## 2018-07-12 DIAGNOSIS — M54.12 CERVICAL NEURALGIA: ICD-10-CM

## 2018-07-12 PROCEDURE — 2500000003 HC RX 250 WO HCPCS

## 2018-07-12 PROCEDURE — 62323 NJX INTERLAMINAR LMBR/SAC: CPT

## 2018-07-12 PROCEDURE — 99152 MOD SED SAME PHYS/QHP 5/>YRS: CPT

## 2018-07-12 PROCEDURE — 20605 DRAIN/INJ JOINT/BURSA W/O US: CPT

## 2018-07-12 PROCEDURE — 3209999900 FLUORO FOR SURGICAL PROCEDURES

## 2018-07-12 PROCEDURE — 2580000003 HC RX 258

## 2018-07-12 PROCEDURE — 20610 DRAIN/INJ JOINT/BURSA W/O US: CPT

## 2018-07-12 PROCEDURE — 6360000002 HC RX W HCPCS

## 2018-07-12 RX ORDER — 0.9 % SODIUM CHLORIDE 0.9 %
VIAL (ML) INJECTION
Status: COMPLETED | OUTPATIENT
Start: 2018-07-12 | End: 2018-07-12

## 2018-07-12 RX ORDER — METHYLPREDNISOLONE ACETATE 80 MG/ML
INJECTION, SUSPENSION INTRA-ARTICULAR; INTRALESIONAL; INTRAMUSCULAR; SOFT TISSUE
Status: COMPLETED | OUTPATIENT
Start: 2018-07-12 | End: 2018-07-12

## 2018-07-12 RX ORDER — LIDOCAINE HYDROCHLORIDE 10 MG/ML
INJECTION, SOLUTION EPIDURAL; INFILTRATION; INTRACAUDAL; PERINEURAL
Status: COMPLETED | OUTPATIENT
Start: 2018-07-12 | End: 2018-07-12

## 2018-07-12 RX ORDER — BUPIVACAINE HYDROCHLORIDE 5 MG/ML
INJECTION, SOLUTION EPIDURAL; INTRACAUDAL
Status: COMPLETED | OUTPATIENT
Start: 2018-07-12 | End: 2018-07-12

## 2018-07-12 RX ORDER — MIDAZOLAM HYDROCHLORIDE 1 MG/ML
INJECTION INTRAMUSCULAR; INTRAVENOUS
Status: COMPLETED | OUTPATIENT
Start: 2018-07-12 | End: 2018-07-12

## 2018-07-12 RX ORDER — BUPIVACAINE HYDROCHLORIDE 2.5 MG/ML
INJECTION, SOLUTION EPIDURAL; INFILTRATION; INTRACAUDAL
Status: COMPLETED | OUTPATIENT
Start: 2018-07-12 | End: 2018-07-12

## 2018-07-12 RX ADMIN — BUPIVACAINE HYDROCHLORIDE 2.5 ML: 2.5 INJECTION, SOLUTION EPIDURAL; INFILTRATION; INTRACAUDAL at 14:18

## 2018-07-12 RX ADMIN — Medication 1.5 ML: at 14:19

## 2018-07-12 RX ADMIN — MIDAZOLAM HYDROCHLORIDE 2 MG: 1 INJECTION INTRAMUSCULAR; INTRAVENOUS at 14:17

## 2018-07-12 RX ADMIN — BUPIVACAINE HYDROCHLORIDE 4 ML: 5 INJECTION, SOLUTION EPIDURAL; INTRACAUDAL at 14:21

## 2018-07-12 RX ADMIN — METHYLPREDNISOLONE ACETATE 80 MG: 80 INJECTION, SUSPENSION INTRA-ARTICULAR; INTRALESIONAL; INTRAMUSCULAR; SOFT TISSUE at 14:20

## 2018-07-12 RX ADMIN — BUPIVACAINE HYDROCHLORIDE 9 ML: 5 INJECTION, SOLUTION EPIDURAL; INTRACAUDAL at 14:21

## 2018-07-12 RX ADMIN — LIDOCAINE HYDROCHLORIDE 3 ML: 10 INJECTION, SOLUTION EPIDURAL; INFILTRATION; INTRACAUDAL; PERINEURAL at 14:17

## 2018-07-12 ASSESSMENT — PAIN DESCRIPTION - DESCRIPTORS: DESCRIPTORS: ACHING;CONSTANT;PRESSURE

## 2018-07-12 ASSESSMENT — PAIN DESCRIPTION - PROGRESSION: CLINICAL_PROGRESSION: NOT CHANGED

## 2018-07-12 ASSESSMENT — PAIN - FUNCTIONAL ASSESSMENT
PAIN_FUNCTIONAL_ASSESSMENT: 0-10

## 2018-07-12 ASSESSMENT — PAIN DESCRIPTION - ONSET: ONSET: ON-GOING

## 2018-07-12 ASSESSMENT — PAIN SCALES - GENERAL: PAINLEVEL_OUTOF10: 9

## 2018-07-12 ASSESSMENT — PAIN DESCRIPTION - ORIENTATION: ORIENTATION: RIGHT;UPPER

## 2018-07-12 ASSESSMENT — PAIN DESCRIPTION - PAIN TYPE: TYPE: CHRONIC PAIN

## 2018-07-12 ASSESSMENT — PAIN DESCRIPTION - FREQUENCY: FREQUENCY: CONTINUOUS

## 2018-07-12 NOTE — PROGRESS NOTES
Procedure:  Level of Consciousness: [x]Alert [x]Oriented []Disoriented []Lethargic  Anxiety Level: [x]Calm []Anxious []Depressed []Other  Skin: [x]Warm [x]Dry []Cool []Moist []Intact []Other  Cardiovascular: [x]Palpitations: [x]Never []Occasionally []Frequently  Chest Pain: [x]No []Yes  Respiratory:  [x]Unlabored []Labored []Cough ([] Productive []Unproductive)  HCG Required: [x]No []Yes   Results: []Negative []Positive  Knowledge Level:        [x]Patient/Other verbalized understanding of pre-procedure instructions. [x]Assessment of post-op care needs (transportation, responsible caregiver)        [x]Able to discuss health care problems and how to deal with it.   Factors that Affect Teaching:        Language Barrier: [x]No []Yes - why:        Hearing Loss:        []No [x]Yes            Corrective Device:  []Yes [x]No        Vision Loss:           [x]No []Yes            Corrective Device:  []Yes [x]No        Memory Loss:       [x]No []Yes            []Short Term []Long Term  Motivational Level:  [x]Asks Questions                  []Extremely Anxious       [x]Seems Interested               []Seems Uninterested                  []Denies need for Education  Risk for Injury:  [x]Patient oriented to person, place and time  []History of frequent falls/loss of balance  Nutritional:  []Change in appetite   []Weight Gain   []Weight Loss  Functional:  []Requires assistance with ADL's

## 2018-07-12 NOTE — PROCEDURES
Edmundo Litten is a 56 y.o. female patient. 1. Cervical disc disorder with radiculopathy      Past Medical History:   Diagnosis Date    Anxiety     Arthritis     Asthma     Cervical radiculopathy     COPD (chronic obstructive pulmonary disease) (HCC)     DDD (degenerative disc disease), cervical     Thyroid disease      Blood pressure (!) 143/95, pulse 95, temperature 97.6 °F (36.4 °C), temperature source Temporal, resp. rate 20, height 5' 5\" (1.651 m), weight 188 lb (85.3 kg), SpO2 97 %. Procedures  DATE:  7/12/2018           REASON FOR VISIT: Principal Problem:    Cervical disc disorder with radiculopathy  Resolved Problems:    * No resolved hospital problems. *                                             PROCEDURE:   Cervical Epidural Injection. [] Moderate Sedation:  2 mg versed    DESCRIPTION OF PROCEDURE:    After obtaining informed consent, the patient was taken to the procedure room, placed in  [x] left lateral decubitus  [] Prone position and sterilely prepped. The procedure was performed under fluoroscopic guidance. First, 5ml of 1%Xylocaine was used for local anesthesia and a 19-gauge Echo it needle was advanced to the epidural space at cervical 7. This was confirmed on the fluoroscope with an injection of [x] 2ml []  - ml Contrast.  Then, [x] 2.5ml []  - ml of 0.25% Marcaine, [x]  1.5 ml Normal Saline, and  [x] 80 mg of Depo Medrol was gently injected. There were no complications. DIAGNOSIS  [x] *Cervical Degenerative Disc Disease    [x] *Cervical Radiculopathy  [] *Cervical Spinal Stenosis                       [] Other       DATE: 7/12/2018      REASON FOR VISIT: Principal Problem:    Cervical disc disorder with radiculopathy  Resolved Problems:    * No resolved hospital problems.  *      PROCEDURE: Steroid Joint Injection    [x] Moderate Sedation      [x] Fluoroscopy Used  [] Ultrasound guidance    DESCRIPTION OF PROCEDURE:    After obtaining informed consent, 9 ml of 0.5%